# Patient Record
Sex: MALE | Race: WHITE | NOT HISPANIC OR LATINO
[De-identification: names, ages, dates, MRNs, and addresses within clinical notes are randomized per-mention and may not be internally consistent; named-entity substitution may affect disease eponyms.]

---

## 2019-10-29 PROBLEM — Z00.00 ENCOUNTER FOR PREVENTIVE HEALTH EXAMINATION: Status: ACTIVE | Noted: 2019-10-29

## 2019-11-15 ENCOUNTER — APPOINTMENT (OUTPATIENT)
Dept: ORTHOPEDIC SURGERY | Facility: CLINIC | Age: 39
End: 2019-11-15
Payer: MEDICAID

## 2019-12-05 DIAGNOSIS — M25.552 PAIN IN RIGHT HIP: ICD-10-CM

## 2019-12-05 DIAGNOSIS — M25.551 PAIN IN RIGHT HIP: ICD-10-CM

## 2019-12-06 ENCOUNTER — APPOINTMENT (OUTPATIENT)
Dept: ORTHOPEDIC SURGERY | Facility: CLINIC | Age: 39
End: 2019-12-06
Payer: MEDICAID

## 2019-12-06 ENCOUNTER — OUTPATIENT (OUTPATIENT)
Dept: OUTPATIENT SERVICES | Facility: HOSPITAL | Age: 39
LOS: 1 days | Discharge: HOME | End: 2019-12-06
Payer: MEDICAID

## 2019-12-06 VITALS — BODY MASS INDEX: 27.3 KG/M2 | HEIGHT: 71 IN | WEIGHT: 195 LBS

## 2019-12-06 DIAGNOSIS — C80.1 MALIGNANT (PRIMARY) NEOPLASM, UNSPECIFIED: ICD-10-CM

## 2019-12-06 DIAGNOSIS — Z72.89 OTHER PROBLEMS RELATED TO LIFESTYLE: ICD-10-CM

## 2019-12-06 DIAGNOSIS — Z78.9 OTHER SPECIFIED HEALTH STATUS: ICD-10-CM

## 2019-12-06 DIAGNOSIS — M25.559 PAIN IN UNSPECIFIED HIP: ICD-10-CM

## 2019-12-06 PROCEDURE — 99204 OFFICE O/P NEW MOD 45 MIN: CPT

## 2019-12-06 PROCEDURE — 73522 X-RAY EXAM HIPS BI 3-4 VIEWS: CPT | Mod: 26

## 2019-12-23 ENCOUNTER — OUTPATIENT (OUTPATIENT)
Dept: OUTPATIENT SERVICES | Facility: HOSPITAL | Age: 39
LOS: 1 days | Discharge: HOME | End: 2019-12-23
Payer: MEDICAID

## 2019-12-23 DIAGNOSIS — M25.852 OTHER SPECIFIED JOINT DISORDERS, LEFT HIP: ICD-10-CM

## 2019-12-23 PROCEDURE — 73722 MRI JOINT OF LWR EXTR W/DYE: CPT | Mod: 26,LT

## 2019-12-23 PROCEDURE — 73525 CONTRAST X-RAY OF HIP: CPT | Mod: 26,LT

## 2019-12-23 PROCEDURE — 27093 INJECTION FOR HIP X-RAY: CPT | Mod: LT

## 2020-01-03 ENCOUNTER — APPOINTMENT (OUTPATIENT)
Dept: ORTHOPEDIC SURGERY | Facility: CLINIC | Age: 40
End: 2020-01-03
Payer: MEDICAID

## 2020-01-03 PROCEDURE — 99214 OFFICE O/P EST MOD 30 MIN: CPT

## 2020-01-23 ENCOUNTER — FORM ENCOUNTER (OUTPATIENT)
Age: 40
End: 2020-01-23

## 2020-01-24 ENCOUNTER — OUTPATIENT (OUTPATIENT)
Dept: OUTPATIENT SERVICES | Facility: HOSPITAL | Age: 40
LOS: 1 days | End: 2020-01-24

## 2020-01-24 ENCOUNTER — APPOINTMENT (OUTPATIENT)
Dept: CT IMAGING | Facility: CLINIC | Age: 40
End: 2020-01-24
Payer: MEDICAID

## 2020-01-24 PROCEDURE — 73700 CT LOWER EXTREMITY W/O DYE: CPT | Mod: 26,LT

## 2020-02-05 DIAGNOSIS — Z01.818 ENCOUNTER FOR OTHER PREPROCEDURAL EXAMINATION: ICD-10-CM

## 2020-02-05 DIAGNOSIS — S73.192A OTHER SPRAIN OF LEFT HIP, INITIAL ENCOUNTER: ICD-10-CM

## 2020-02-07 ENCOUNTER — APPOINTMENT (OUTPATIENT)
Dept: ORTHOPEDIC SURGERY | Facility: CLINIC | Age: 40
End: 2020-02-07
Payer: MEDICAID

## 2020-02-07 VITALS
SYSTOLIC BLOOD PRESSURE: 130 MMHG | BODY MASS INDEX: 26.88 KG/M2 | WEIGHT: 192 LBS | DIASTOLIC BLOOD PRESSURE: 80 MMHG | HEIGHT: 71 IN

## 2020-02-07 PROCEDURE — 99214 OFFICE O/P EST MOD 30 MIN: CPT

## 2020-02-27 ENCOUNTER — APPOINTMENT (OUTPATIENT)
Age: 40
End: 2020-02-27
Payer: MEDICAID

## 2020-02-27 PROCEDURE — 29879 ARTHRS KNE SRG ABRASJ ARTHRP: CPT | Mod: LT

## 2020-02-27 PROCEDURE — 29916 HIP ARTHRO W/LABRAL REPAIR: CPT | Mod: LT

## 2020-02-27 PROCEDURE — 29914 HIP ARTHRO W/FEMOROPLASTY: CPT | Mod: LT

## 2020-03-06 ENCOUNTER — APPOINTMENT (OUTPATIENT)
Dept: ORTHOPEDIC SURGERY | Facility: CLINIC | Age: 40
End: 2020-03-06
Payer: MEDICAID

## 2020-03-06 PROCEDURE — 99024 POSTOP FOLLOW-UP VISIT: CPT

## 2020-04-06 ENCOUNTER — APPOINTMENT (OUTPATIENT)
Dept: ORTHOPEDIC SURGERY | Facility: CLINIC | Age: 40
End: 2020-04-06
Payer: MEDICAID

## 2020-04-06 PROCEDURE — 99024 POSTOP FOLLOW-UP VISIT: CPT

## 2020-05-20 ENCOUNTER — APPOINTMENT (OUTPATIENT)
Dept: ORTHOPEDIC SURGERY | Facility: CLINIC | Age: 40
End: 2020-05-20

## 2020-05-26 NOTE — HISTORY OF PRESENT ILLNESS
[Home] : at home, [unfilled] , at the time of the visit. [Other Location: e.g. Home (Enter Location, City,State)___] : at [unfilled] [Verbal consent obtained from patient] : the patient, [unfilled] [de-identified] : DOS: 2-27-20 s/p ~ 2.5 months\par Left hip arthroscopy , femoroplasty, labral repair vs repair, acetabuloplasty [de-identified] : Over all very happy.  Progressing faster than expected and therapist agrees. Only perisstent isueis occasialnal achy anterior hip pain, worse during start up, resolves with activity and stretching in most cases.  Worst with resisted hip flexion. [de-identified] : Left hip ROM near symmetric to contralatral, lacking 10 degrees abduction.  No TTP.  - impingement and stinchfield today.  neuro exma intact [de-identified] : ~2.5 months sp surgery as above, some persistent symptoms seem most likely associate dwith hip flexors although given overall quality of anterior abrum cannot rule out associated pain.  Pt feels better than before surgery regardless which is a good sign.  Continue rehab per protocol.  Pain meds provided for occasional severe symptoms that at times limit sleep.  Did not tolerate Ambien.

## 2020-08-24 ENCOUNTER — NON-APPOINTMENT (OUTPATIENT)
Age: 40
End: 2020-08-24

## 2020-09-11 ENCOUNTER — APPOINTMENT (OUTPATIENT)
Dept: ORTHOPEDIC SURGERY | Facility: CLINIC | Age: 40
End: 2020-09-11
Payer: MEDICAID

## 2020-09-11 VITALS
BODY MASS INDEX: 26.88 KG/M2 | OXYGEN SATURATION: 97 % | WEIGHT: 192 LBS | HEIGHT: 71 IN | TEMPERATURE: 98.3 F | HEART RATE: 93 BPM

## 2020-09-11 PROCEDURE — 99213 OFFICE O/P EST LOW 20 MIN: CPT

## 2020-09-11 PROCEDURE — 73502 X-RAY EXAM HIP UNI 2-3 VIEWS: CPT | Mod: LT

## 2021-01-08 ENCOUNTER — APPOINTMENT (OUTPATIENT)
Dept: ORTHOPEDIC SURGERY | Facility: CLINIC | Age: 41
End: 2021-01-08
Payer: MEDICAID

## 2021-01-08 VITALS
HEIGHT: 71 IN | BODY MASS INDEX: 27.3 KG/M2 | HEART RATE: 79 BPM | TEMPERATURE: 98 F | WEIGHT: 195 LBS | OXYGEN SATURATION: 98 %

## 2021-01-08 DIAGNOSIS — S73.192A OTHER SPRAIN OF LEFT HIP, INITIAL ENCOUNTER: ICD-10-CM

## 2021-01-08 DIAGNOSIS — M25.852 OTHER SPECIFIED JOINT DISORDERS, LEFT HIP: ICD-10-CM

## 2021-01-08 PROCEDURE — 99072 ADDL SUPL MATRL&STAF TM PHE: CPT

## 2021-01-08 PROCEDURE — 99214 OFFICE O/P EST MOD 30 MIN: CPT

## 2021-01-08 RX ORDER — DOCUSATE SODIUM 100 MG/1
100 CAPSULE ORAL TWICE DAILY
Qty: 20 | Refills: 0 | Status: DISCONTINUED | COMMUNITY
Start: 2020-02-07 | End: 2021-01-08

## 2021-01-08 RX ORDER — AMOXICILLIN 875 MG/1
875 TABLET, FILM COATED ORAL
Qty: 20 | Refills: 0 | Status: DISCONTINUED | COMMUNITY
Start: 2020-01-02 | End: 2021-01-08

## 2021-01-08 RX ORDER — TRIAMCINOLONE ACETONIDE 1 MG/G
0.1 CREAM TOPICAL
Qty: 454 | Refills: 0 | Status: DISCONTINUED | COMMUNITY
Start: 2020-01-29 | End: 2021-01-08

## 2021-01-08 RX ORDER — TRIAMCINOLONE ACETONIDE 0.25 MG/G
0.03 CREAM TOPICAL
Qty: 15 | Refills: 0 | Status: DISCONTINUED | COMMUNITY
Start: 2019-09-18 | End: 2021-01-08

## 2021-01-08 RX ORDER — CELECOXIB 200 MG/1
200 CAPSULE ORAL DAILY
Qty: 30 | Refills: 1 | Status: DISCONTINUED | COMMUNITY
Start: 2020-02-26 | End: 2021-01-08

## 2021-01-08 RX ORDER — HYDROCODONE BITARTRATE AND ACETAMINOPHEN 5; 325 MG/1; MG/1
5-325 TABLET ORAL
Qty: 40 | Refills: 0 | Status: DISCONTINUED | COMMUNITY
Start: 2020-03-27 | End: 2021-01-08

## 2021-01-08 RX ORDER — HYDROCODONE BITARTRATE AND ACETAMINOPHEN 5; 325 MG/1; MG/1
5-325 TABLET ORAL
Qty: 40 | Refills: 0 | Status: DISCONTINUED | COMMUNITY
Start: 2020-03-06 | End: 2021-01-08

## 2021-01-08 RX ORDER — HYDROCODONE BITARTRATE AND ACETAMINOPHEN 5; 325 MG/1; MG/1
5-325 TABLET ORAL
Qty: 40 | Refills: 0 | Status: DISCONTINUED | COMMUNITY
Start: 2020-02-07 | End: 2021-01-08

## 2021-01-08 RX ORDER — HYDROCODONE BITARTRATE AND ACETAMINOPHEN 5; 325 MG/1; MG/1
5-325 TABLET ORAL
Qty: 40 | Refills: 0 | Status: DISCONTINUED | COMMUNITY
Start: 2020-05-20 | End: 2021-01-08

## 2021-01-08 RX ORDER — LIDOCAINE HYDROCHLORIDE 20 MG/ML
2 SOLUTION ORAL; TOPICAL
Qty: 500 | Refills: 0 | Status: DISCONTINUED | COMMUNITY
Start: 2020-01-02 | End: 2021-01-08

## 2021-02-02 PROBLEM — M25.852 FEMOROACETABULAR IMPINGEMENT OF LEFT HIP: Status: ACTIVE | Noted: 2019-12-06

## 2021-02-02 PROBLEM — S73.192A TEAR OF ACETABULAR LABRUM, LEFT, INITIAL ENCOUNTER: Status: ACTIVE | Noted: 2020-01-07

## 2021-02-19 NOTE — DISCUSSION/SUMMARY
[Surgical risks reviewed] : Surgical risks reviewed [de-identified] : 40-year-old male 11 months status post left hip surgery with arthroscopy femoroplasty acetabuloplasty and labral repair.  Intraoperatively patient was found to have somewhat degenerative changes in the anterior labrum which was partially deficient but not severe enough to warrant a primary labral reconstruction.\par \par He has some low anterior hip pain which is intermittent in nature.  Had long discussion with the patient regarding his recovery.  Patient is clearly plateaued with regards to his response to therapy and has had an incomplete result.  Symptoms seem to be coming more frequent and he is developing increasing mechanical symptoms.  Given the degenerative nature of the labrum recommend a repeat MRI at this time to evaluate, patient may be a candidate for a labral reconstruction procedure if there is a recurrent tear given his primary surgical procedure findings.  This will also allow us to further evaluate potential a small residual cam lesion in the posterior aspect laterally on the femoral head.\par \par Patient will follow-up once the MRI arthrogram is completed.  He understands that if positive a surgical procedure may be required and is able and willing to undergo this surgery if necessary.

## 2021-02-19 NOTE — PHYSICAL EXAM
[de-identified] : Jan 08, 2021 \par General: Patient is awake and alert, demonstrates appropriate mood and affect, exhibits normal breathing and is in no acute distress.\par Psych: The patient is appropriately dressed and groomed, maintains good eye contact. Alert and oriented x 3. Normal attention/concentration, fund of knowledge and recall. Normal speech rate and rhythm. No hallucinations, suicidal or homicidal ideations. Demonstrates expected level of insight and judgment regarding health.\par Skin: The patient has no chronic skin lesions, rashes, or ulcers. There is no induration or erythema of uninvolved extremities. For skin exam of involved extremity refer to detailed musculoskeletal/extremity exam. \par Lymph: No cervical, axillary, or popliteal lymphadenopathy. There is no swelling or lymphedema in uninvolved extremities, refer to detailed exam for involved limbs.\par Cardiovascular: No visible jugular venous distention. Normal point of maximal impulse without thrill. There is brisk capillary refill in the digits of the affected extremity. They are symmetric pulses in the bilateral upper and lower extremities. \par Respiratory: The patient is in no apparent respiratory distress. They're taking full deep breaths with normal excursion, without use of accessory muscles or evidence of audible wheezes or stridor without the use of a stethoscope. \par Neurological: 5/5 motor strength and sensation intact throughout uninvolved upper and lower extremities, refer to detailed musculoskeletal exam regarding involved extremity.\par Neck: Full range of motion with flexion, extension, rotation, and side bending; no palpable crepitus, normal alignment and lordosis, symmetric appearance, midline trachea, no thyroid hypertrophy or nodules\par Musculoskeletal: [normal gait]. good posture. normal clinical alignment of the spine. full range of motion in [bilateral] upper and [bilateral] lower extremities.\par \par Left Hip exam:\par Inspection:  Skin exam, Evaluation of Trendellenberg sign and standing pelvic obliquity, evaluation for antalgic or Trendellenberg gait\par Palpation:  Evaluation for tenderness at the pubic symphysis, in the inguinal crease, along the psoas tendon, at the abductor tendon insertion and trochanteric bursa, posteriorly along the external rotators, along the ischial tuberosity and at the SI joint\par Strength testing:  Hip flexion, abduction, extention and adduction with specific assesment for pain with resisted hip flexion\par Special tests:  DESI, FADIR, Palmira, Impingement test, Stinchfield\par Concomitant L-spine exam performed with paraspinal, central, and SI joint palpation.  Evaluation of lumbar lordosis, seated and supine straight leg raise, slump test, sensory exam, and strength testing L2-S1\par \par Significant positive findings were as follows, with all other findings within normal limits:\par Range of Motion: Left hip range of motion today is excellent.  Flexion to 100 degrees.  Internal rotation to 45 degrees.  External rotation is 60\par Additional: Patient does have tenderness to palpation deep within the inguinal crease anteriorly.  Otherwise nontender.  5 out of 5 strength with hip abduction and no pelvic obliquity single-leg stance. \par Patient has clear positive impingement test and positive Stinchfield today.  He has pain with Desi exam.\par \par  [de-identified] : Sept 11, 2020\par Left hip AP pelvis and 45/90 degree Fuller views\par X-rays were performed reviewed interpreted by myself in clinic today and shared with the patient.  These x-rays demonstrate postsurgical changes in the left hip consistent with acetabuloplasty and femoral plasty.  Excellent overall resection on AP views and direct lateral views.  Anterolateral femoral neck does demonstrate some residual sclerosis and overgrowth and slightly increased anterior femoral neck offset.  No significant joint space narrowing.  Hip is well located.  Trace dysplasia consistent with preoperative findings\par \par \par Feb 07, 2020: CT-SCAN of the left hip confirms presence of significant cam deformity, mild pincer lesion with possible partial ossification of the labrum. 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 Superior acetabular subchondral edema but intact chondral surfaces, 3.5 mm joint space. Mild pincer confirmed.\par \par \par Dec 06, 2019: \par Weightbearing AP, lateral, false profile and Fuller views of the bilateral hips was obtained and reviewed. These images demonstrate bilateral femoral acetabular impingement morphology. On AP views there is bilateral crossover sign. There is cam deformity present bilaterally but worse on the right on AP view. With done view cam deformity is significantly more severe at the left side. There is an associated area of sclerosis as well as a herniation pit. Center edge angle is 28 degrees. Alpha angle is 90 degrees bilaterally\par

## 2021-02-19 NOTE — HISTORY OF PRESENT ILLNESS
[Bending] : worsened by bending [Hip Movement] : worsened by hip movement [Running] : worsened by running [Walking] : worsened by walking [Rest] : relieved by rest [Stable] : stable [___ mths] : [unfilled] month(s) ago [5] : a current pain level of 5/10 [Ice] : relieved by ice [de-identified] : Jan 08, 2021\par  \par Mr. LUIS A RAMIREZ is a very pleasant 40 year male who presents himself here today for a 11 month s/p left hip arthroscopy, femoroplasty, labral repair vs repair,acetabuloplasty follow up.  Overall patient's hip continues to feel better than it did prior to surgery however has not recovered sufficiently to allow him to return to his desired activity level.  He still has pain when seated for long periods of time or squatting and while it is less severe it is still relatively frequent.  Most recently he started to develop recurrent mechanical symptoms in the anterior hip which remind him of symptoms prior to his labral repair.  He has continued his postoperative rehabilitation program and therapy and symptoms actually seem to be worsening over the last 2 months.\par While the pain is less frequent than presurgical levels it is still significant when it occurs rated 5-7 on a 10 point scale.\par We had previously discussed potential of performing a repeat MRI at his last visit if his symptoms had not improved and patient elected for continued therapy.  Today he is requesting to proceed with the MRI.\par Patient has continued conservative measures with physical therapy and antiinflammatories beginning immediately postoperatively in March 2020 and has continued until current visit.  In addition patient attempted extensive nonoperative management preop leading up to his February 2020 surgery including PRP injections.\par \par Sept 11, 2020\par Mr. Luis A Ramirez is a very pleasant 40 year old male who presents himself today for a 6.5 month s/p left hip arthroscopy, femoroplasty, labral repair vs repair, acetabuloplasty follow up.  Patient today reports somewhat mixed results with regards to his recovery.  He just recently completed his physical therapy last week.  He feels like he did extremely well and was achieving really great results with regards to function.  Was able to perform box jumps and squats with minimal discomfort.  With regards to the results in his daily life he has had more mixed results.  He attributes this largely to his return to work where he spends large periods of time crouching or squatting.  If able to avoid crouching and squatting he usually is able to function at a fairly high level.  He can go several days without pain.  He is able to run 2 or 3 miles at a time.  Unfortunately however on other days after periods of prolonged squatting or working on the ground he will have vague deep knee pain in the anterior aspect of the hip which frequently will last for several days.  It does seem that this pain will oftentimes respond to soft tissue management early during therapy at which point he is able to return to high-level function but when this pain occurs it can be limiting for several days as described.  He can have some trouble sleeping secondary to the pain.  He has been utilizing anti-inflammatories which partially controls but not completely controls this.  There are some occasional associated sharp clicking sensations during these exacerbations.\par \par Patient is frustrated by the somewhat dichotomous nature of his recovery at this point and is concerned about long-term prospects with regards to his occupational requirements.  He would like to discuss current function and long-term outcome/reevaluation timeline

## 2021-06-12 DIAGNOSIS — M94.252 CHONDROMALACIA, LEFT HIP: ICD-10-CM

## 2021-11-09 ENCOUNTER — LABORATORY RESULT (OUTPATIENT)
Age: 41
End: 2021-11-09

## 2021-11-09 ENCOUNTER — NON-APPOINTMENT (OUTPATIENT)
Age: 41
End: 2021-11-09

## 2021-11-09 ENCOUNTER — APPOINTMENT (OUTPATIENT)
Dept: RHEUMATOLOGY | Facility: CLINIC | Age: 41
End: 2021-11-09
Payer: MEDICAID

## 2021-11-09 VITALS
TEMPERATURE: 97.9 F | DIASTOLIC BLOOD PRESSURE: 92 MMHG | BODY MASS INDEX: 27.3 KG/M2 | HEART RATE: 67 BPM | HEIGHT: 71 IN | SYSTOLIC BLOOD PRESSURE: 134 MMHG | WEIGHT: 195 LBS

## 2021-11-09 DIAGNOSIS — M94.1 RELAPSING POLYCHONDRITIS: ICD-10-CM

## 2021-11-09 PROCEDURE — 99205 OFFICE O/P NEW HI 60 MIN: CPT

## 2021-11-09 RX ORDER — CELECOXIB 200 MG/1
200 CAPSULE ORAL DAILY
Qty: 30 | Refills: 0 | Status: DISCONTINUED | COMMUNITY
Start: 2020-02-07 | End: 2021-11-09

## 2021-11-10 LAB
CRP SERPL-MCNC: <3 MG/L
ERYTHROCYTE [SEDIMENTATION RATE] IN BLOOD BY WESTERGREN METHOD: 2 MM/HR
RHEUMATOID FACT SER QL: <10 IU/ML

## 2021-11-10 NOTE — ASSESSMENT
[FreeTextEntry1] : 41 year old male with a history of left hip labral tear and LI, alopecia, who presents for evaluation of left ear lesion with concern for relapsing polychondritis.\par \par 1. 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; will likely need CT chest \par -Check serologies to rule out vasculitis, SLE, RA, Sjogrns, SpA; lower suspicion\par -Medrol dose pack sent in case of flare\par -F/u 3-4 weeks \par

## 2021-11-10 NOTE — HISTORY OF PRESENT ILLNESS
[FreeTextEntry1] : 41 year old male with a history of left hip labral tear and LI, alopecia, who presents for evaluation of relapsing polychondritis.\par \par \par Patient reports three months ago developing throat pain that's constant in nature. He had ultrasound neck and CT neck that were reportedly unremarkable. Reports hoarseness and dysphagia when symptoms flare. Denies dyspnea, cough, orthopnea, wheezing, fatigue. Reports rhinorrhea and nasal congestion at nighttime, and noted right TMJ pain. Denies history of chronic sinusitis, epistaxis, crusting. He states 2 weeks ago developing left ear swelling and redness without preceding trauma or event. Denies any hearing issues. Denies visual disturbances, eye redness, eye pain. He received a course of steroids that helped his throat pain and left ear symptoms, last dose was 1 week ago. Denies fevers, joint pain, rash, chest pain, GI or  complaints. \par \par Ultrasound neck soft tissue 10/20/21 suggested tiny cysts right lobe thyroid\par \par \par

## 2021-11-10 NOTE — PHYSICAL EXAM
[General Appearance - Alert] : alert [General Appearance - In No Acute Distress] : in no acute distress [Sclera] : the sclera and conjunctiva were normal [PERRL With Normal Accommodation] : pupils were equal in size, round, and reactive to light [Extraocular Movements] : extraocular movements were intact [Neck Appearance] : the appearance of the neck was normal [Heart Rate And Rhythm] : heart rate was normal and rhythm regular [Heart Sounds] : normal S1 and S2 [Abdomen Soft] : soft [Bowel Sounds] : normal bowel sounds [Abnormal Walk] : normal gait [Nail Clubbing] : no clubbing  or cyanosis of the fingernails [Involuntary Movements] : no involuntary movements were seen [Musculoskeletal - Swelling] : no joint swelling seen [Motor Tone] : muscle strength and tone were normal [] : no rash [Skin Lesions] : no skin lesions [FreeTextEntry1] : Left auricle erythematous and mild swelling cartilaginous portion antihelix/helix, no tenderness, normal lobule. TM normal. Nares no ulcerations. Nasal septum non tender. Larynx, trachea non tender

## 2021-11-11 LAB
ACE BLD-CCNC: 47 U/L
B2 GLYCOPROT1 IGA SERPL IA-ACNC: <5 SAU
B2 GLYCOPROT1 IGG SER-ACNC: <5 SGU
B2 GLYCOPROT1 IGM SER-ACNC: <5 SMU
C3 SERPL-MCNC: 111 MG/DL
C4 SERPL-MCNC: 21 MG/DL
CARDIOLIPIN AB SER IA-ACNC: NEGATIVE
CARDIOLIPIN IGM SER-MCNC: 6.6 MPL
CARDIOLIPIN IGM SER-MCNC: <5 GPL
CCP AB SER IA-ACNC: <8 UNITS
DEPRECATED CARDIOLIPIN IGA SER: <5 APL
DSDNA AB SER-ACNC: <12 IU/ML
ENA RNP AB SER IA-ACNC: 1.7 AL
ENA SCL70 IGG SER IA-ACNC: <0.2 AL
ENA SM AB SER IA-ACNC: <0.2 AL
ENA SS-A AB SER IA-ACNC: <0.2 AL
ENA SS-B AB SER IA-ACNC: <0.2 AL
HBV CORE IGG+IGM SER QL: NONREACTIVE
HBV CORE IGM SER QL: NONREACTIVE
HBV SURFACE AB SER QL: NONREACTIVE
HBV SURFACE AG SER QL: NONREACTIVE
HCV AB SER QL: NONREACTIVE
HCV S/CO RATIO: 0.22 S/CO
MPO AB + PR3 PNL SER: NORMAL
RF+CCP IGG SER-IMP: NEGATIVE

## 2021-11-12 LAB — ANA SER IF-ACNC: NEGATIVE

## 2021-11-19 LAB — HLA-B27 RELATED AG QL: NEGATIVE

## 2021-12-07 ENCOUNTER — APPOINTMENT (OUTPATIENT)
Dept: RHEUMATOLOGY | Facility: CLINIC | Age: 41
End: 2021-12-07
Payer: MEDICAID

## 2021-12-07 VITALS
DIASTOLIC BLOOD PRESSURE: 80 MMHG | OXYGEN SATURATION: 98 % | HEIGHT: 71 IN | WEIGHT: 195 LBS | TEMPERATURE: 97.9 F | SYSTOLIC BLOOD PRESSURE: 120 MMHG | HEART RATE: 77 BPM | BODY MASS INDEX: 27.3 KG/M2

## 2021-12-07 DIAGNOSIS — K76.89 OTHER SPECIFIED DISEASES OF LIVER: ICD-10-CM

## 2021-12-07 DIAGNOSIS — I71.2 THORACIC AORTIC ANEURYSM, W/OUT RUPTURE: ICD-10-CM

## 2021-12-07 PROCEDURE — 99214 OFFICE O/P EST MOD 30 MIN: CPT

## 2021-12-07 RX ORDER — METHYLPREDNISOLONE 4 MG/1
4 TABLET ORAL
Qty: 1 | Refills: 0 | Status: DISCONTINUED | COMMUNITY
Start: 2021-11-09 | End: 2021-12-07

## 2021-12-08 NOTE — HISTORY OF PRESENT ILLNESS
[FreeTextEntry1] : 41 year old male with a history of left hip labral tear and LI, alopecia, who presents for evaluation of relapsing polychondritis.\par \par \par 12/8/21:\par Patient did not take steroid course prescribed last visit. His symptoms are stable. He has left ear swelling without discomfort. He has throat pain in the morning that gets better as the day goes on. He denies joint pain, wheezing, dyspnea, orthopnea, nasal pain, discomfort, rash. \par \par Initial visit:\par Patient reports three months ago developing throat pain that's constant in nature. He had ultrasound neck and CT neck that were reportedly unremarkable. Reports hoarseness and dysphagia when symptoms flare. Denies dyspnea, cough, orthopnea, wheezing, fatigue. Reports rhinorrhea and nasal congestion at nighttime, and noted right TMJ pain. Denies history of chronic sinusitis, epistaxis, crusting. He states 2 weeks ago developing left ear swelling and redness without preceding trauma or event. Denies any hearing issues. Denies visual disturbances, eye redness, eye pain. He received a course of steroids that helped his throat pain and left ear symptoms, last dose was 1 week ago. Denies fevers, joint pain, rash, chest pain, GI or  complaints. \par \par Ultrasound neck soft tissue 10/20/21 suggested tiny cysts right lobe thyroid\par \par \par

## 2021-12-08 NOTE — ASSESSMENT
[FreeTextEntry1] : 41 year old male with a history of left hip labral tear and LI, alopecia, who presents for evaluation of left ear lesion with concern for relapsing polychondritis.\par \par 1. 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 CT chest with dilated aorta 3.7 cm; will send to thoracic surgery for evaluation. Liver lesion also found will check ultrasound and send to hepatology.\par -Steroid trial looking for improvement in auricular symptoms; if no improvement f/u ENT ?auricular hematoma\par \par

## 2021-12-08 NOTE — PHYSICAL EXAM
[General Appearance - Alert] : alert [General Appearance - In No Acute Distress] : in no acute distress [Sclera] : the sclera and conjunctiva were normal [PERRL With Normal Accommodation] : pupils were equal in size, round, and reactive to light [Extraocular Movements] : extraocular movements were intact [Neck Appearance] : the appearance of the neck was normal [Heart Rate And Rhythm] : heart rate was normal and rhythm regular [Heart Sounds] : normal S1 and S2 [Bowel Sounds] : normal bowel sounds [Abdomen Soft] : soft [Abnormal Walk] : normal gait [Nail Clubbing] : no clubbing  or cyanosis of the fingernails [Involuntary Movements] : no involuntary movements were seen [Musculoskeletal - Swelling] : no joint swelling seen [Motor Tone] : muscle strength and tone were normal [] : no rash [Skin Lesions] : no skin lesions [FreeTextEntry1] : Left auricle mild swelling cartilaginous portion superiorly. Helix is not involve. Looks ecchymotic, tender to palpation, normal lobule. TM normal. Nares no ulcerations. Nasal septum non tender. Larynx, trachea non tender

## 2022-01-13 DIAGNOSIS — H35.00 UNSPECIFIED BACKGROUND RETINOPATHY: ICD-10-CM

## 2022-01-13 RX ORDER — PREDNISONE 20 MG/1
20 TABLET ORAL
Qty: 42 | Refills: 0 | Status: ACTIVE | COMMUNITY
Start: 2022-01-13 | End: 1900-01-01

## 2022-02-16 ENCOUNTER — APPOINTMENT (OUTPATIENT)
Dept: RHEUMATOLOGY | Facility: CLINIC | Age: 42
End: 2022-02-16
Payer: MEDICAID

## 2022-02-16 VITALS
OXYGEN SATURATION: 98 % | TEMPERATURE: 97.9 F | HEIGHT: 71 IN | BODY MASS INDEX: 28 KG/M2 | SYSTOLIC BLOOD PRESSURE: 120 MMHG | HEART RATE: 77 BPM | WEIGHT: 200 LBS | DIASTOLIC BLOOD PRESSURE: 80 MMHG

## 2022-02-16 DIAGNOSIS — H34.8192 CENTRAL RETINAL VEIN OCCLUSION, UNSPECIFIED EYE, STABLE: ICD-10-CM

## 2022-02-16 PROCEDURE — 99214 OFFICE O/P EST MOD 30 MIN: CPT

## 2022-05-11 ENCOUNTER — APPOINTMENT (OUTPATIENT)
Dept: RHEUMATOLOGY | Facility: CLINIC | Age: 42
End: 2022-05-11

## 2024-07-23 ENCOUNTER — NON-APPOINTMENT (OUTPATIENT)
Age: 44
End: 2024-07-23

## 2024-08-02 ENCOUNTER — APPOINTMENT (OUTPATIENT)
Dept: ORTHOPEDIC SURGERY | Facility: CLINIC | Age: 44
End: 2024-08-02

## 2024-08-02 VITALS — BODY MASS INDEX: 28 KG/M2 | HEIGHT: 71 IN | WEIGHT: 200 LBS

## 2024-08-02 DIAGNOSIS — M25.852 OTHER SPECIFIED JOINT DISORDERS, LEFT HIP: ICD-10-CM

## 2024-08-02 PROCEDURE — 99203 OFFICE O/P NEW LOW 30 MIN: CPT | Mod: 25

## 2024-08-02 PROCEDURE — 72190 X-RAY EXAM OF PELVIS: CPT
